# Patient Record
Sex: FEMALE | Employment: OTHER | URBAN - METROPOLITAN AREA
[De-identification: names, ages, dates, MRNs, and addresses within clinical notes are randomized per-mention and may not be internally consistent; named-entity substitution may affect disease eponyms.]

---

## 2021-11-01 DIAGNOSIS — Z76.89 ESTABLISHING CARE WITH NEW DOCTOR, ENCOUNTER FOR: Primary | ICD-10-CM

## 2021-11-23 ENCOUNTER — OFFICE VISIT (OUTPATIENT)
Dept: OBSTETRICS AND GYNECOLOGY | Facility: CLINIC | Age: 66
End: 2021-11-23
Payer: MEDICARE

## 2021-11-23 VITALS
HEART RATE: 79 BPM | HEIGHT: 64 IN | BODY MASS INDEX: 25.35 KG/M2 | WEIGHT: 148.5 LBS | SYSTOLIC BLOOD PRESSURE: 139 MMHG | DIASTOLIC BLOOD PRESSURE: 82 MMHG

## 2021-11-23 DIAGNOSIS — M79.7 FIBROMYOSITIS: ICD-10-CM

## 2021-11-23 DIAGNOSIS — N95.2 ATROPHIC VAGINITIS: ICD-10-CM

## 2021-11-23 DIAGNOSIS — D84.9 IMMUNODEFICIENCY: ICD-10-CM

## 2021-11-23 DIAGNOSIS — N95.1 MENOPAUSAL SYNDROME (HOT FLASHES): ICD-10-CM

## 2021-11-23 DIAGNOSIS — Z01.419 ENCOUNTER FOR ANNUAL ROUTINE GYNECOLOGICAL EXAMINATION: Primary | ICD-10-CM

## 2021-11-23 PROCEDURE — G0101 CA SCREEN;PELVIC/BREAST EXAM: HCPCS | Mod: S$GLB,,, | Performed by: NURSE PRACTITIONER

## 2021-11-23 PROCEDURE — G0101 PR CA SCREEN;PELVIC/BREAST EXAM: ICD-10-PCS | Mod: S$GLB,,, | Performed by: NURSE PRACTITIONER

## 2021-12-06 ENCOUNTER — PATIENT MESSAGE (OUTPATIENT)
Dept: RESEARCH | Facility: HOSPITAL | Age: 66
End: 2021-12-06
Payer: MEDICARE

## 2021-12-09 ENCOUNTER — TELEPHONE (OUTPATIENT)
Dept: PHARMACY | Facility: CLINIC | Age: 66
End: 2021-12-09
Payer: MEDICARE

## 2021-12-09 DIAGNOSIS — Z78.0 MENOPAUSE: Primary | ICD-10-CM

## 2021-12-10 RX ORDER — ESTRADIOL 1 MG/1
1 TABLET ORAL DAILY
Qty: 90 TABLET | Refills: 3 | Status: SHIPPED | OUTPATIENT
Start: 2021-12-10 | End: 2021-12-13

## 2021-12-10 RX ORDER — ESTRADIOL 1 MG/1
3 TABLET ORAL 2 TIMES DAILY
Qty: 30 TABLET | Refills: 11 | Status: SHIPPED | OUTPATIENT
Start: 2021-12-10 | End: 2021-12-10

## 2021-12-13 DIAGNOSIS — N95.1 MENOPAUSAL SYNDROME (HOT FLASHES): Primary | ICD-10-CM

## 2022-01-19 ENCOUNTER — TELEPHONE (OUTPATIENT)
Dept: GASTROENTEROLOGY | Facility: CLINIC | Age: 67
End: 2022-01-19
Payer: MEDICARE

## 2022-01-19 NOTE — TELEPHONE ENCOUNTER
----- Message from Estefania Taylor sent at 1/19/2022  9:18 AM CST -----  Contact: self    ----- Message -----  From: Cory Yañez  Sent: 1/19/2022   8:24 AM CST  To: Jesus Manuel [facility] Staff    Patient called and stated that she had an appt today which is 01- and I could not see appt. Please call patient regarding this matter at [phone]. Thanks

## 2022-04-05 ENCOUNTER — OFFICE VISIT (OUTPATIENT)
Dept: GASTROENTEROLOGY | Facility: CLINIC | Age: 67
End: 2022-04-05
Payer: MEDICARE

## 2022-04-05 VITALS
SYSTOLIC BLOOD PRESSURE: 157 MMHG | OXYGEN SATURATION: 91 % | HEIGHT: 63 IN | WEIGHT: 149 LBS | DIASTOLIC BLOOD PRESSURE: 73 MMHG | BODY MASS INDEX: 26.4 KG/M2 | HEART RATE: 74 BPM

## 2022-04-05 DIAGNOSIS — Z86.010 PERSONAL HISTORY OF COLONIC POLYPS: ICD-10-CM

## 2022-04-05 DIAGNOSIS — R74.8 ELEVATED LIVER ENZYMES: Primary | ICD-10-CM

## 2022-04-05 DIAGNOSIS — F32.A DEPRESSION, UNSPECIFIED: ICD-10-CM

## 2022-04-05 DIAGNOSIS — K73.9 CHRONIC HEPATITIS, UNSPECIFIED: ICD-10-CM

## 2022-04-05 DIAGNOSIS — R76.8 HEPATITIS B CORE ANTIBODY POSITIVE: ICD-10-CM

## 2022-04-05 DIAGNOSIS — K76.0 FATTY LIVER: ICD-10-CM

## 2022-04-05 PROCEDURE — 99204 PR OFFICE/OUTPT VISIT, NEW, LEVL IV, 45-59 MIN: ICD-10-PCS | Mod: S$GLB,,, | Performed by: INTERNAL MEDICINE

## 2022-04-05 PROCEDURE — 99204 OFFICE O/P NEW MOD 45 MIN: CPT | Mod: S$GLB,,, | Performed by: INTERNAL MEDICINE

## 2022-04-05 NOTE — PROGRESS NOTES
Clinic Note    Reason for visit:  The primary encounter diagnosis was Elevated liver enzymes. Diagnoses of Fatty liver, Hepatitis B core antibody positive, Personal history of colonic polyps, Chronic hepatitis, unspecified , and Depression, unspecified  were also pertinent to this visit.    PCP: Justin Muñoz       HPI:  This is a 66 y.o. female who is seeing me due to elevated liver enzymes.  She has been told this for 20+ years.  Her core total antibody has remained positive for hepatitis-B.  She previously had seen Dr. Shahzad Carvalho.  She had a liver biopsy back then was told she had a mild fatty liver.  Mild fibrosis.  She gets immunoglobulin infusions for the past 2 years.  She alternates with diarrhea and constipation.  She had an upper and lower endoscopy about a year ago.  She was told to repeat a colonoscopy in 5 years.  Told she had an ulcer in her esophagus and no follow-up upper endoscopy or medications given from that time.  Previously had colonic polyps but on the last colonoscopy. Previously seen by Dr. Moreno.    Review of Systems   Constitutional: Positive for chills and fatigue. Negative for diaphoresis, fever and unexpected weight change.   HENT: Positive for mouth sores, postnasal drip and sore throat. Negative for nosebleeds, trouble swallowing and voice change.    Eyes: Positive for eye dryness. Negative for pain and discharge.   Respiratory: Positive for cough. Negative for apnea, choking, chest tightness, shortness of breath and wheezing.    Cardiovascular: Negative for chest pain, palpitations, leg swelling and claudication.   Gastrointestinal: Positive for abdominal distention, abdominal pain, constipation, nausea and reflux. Negative for anal bleeding, blood in stool, change in bowel habit, diarrhea, rectal pain, vomiting, fecal incontinence and change in bowel habit.   Genitourinary: Positive for bladder incontinence and hematuria. Negative for difficulty urinating, dysuria, flank pain  and frequency.   Musculoskeletal: Positive for back pain and joint swelling. Negative for arthralgias and joint deformity.   Integumentary:  Negative for color change, rash and wound.   Allergic/Immunologic: Negative for environmental allergies and food allergies.   Neurological: Positive for headaches. Negative for seizures, facial asymmetry, speech difficulty, weakness and memory loss.   Hematological: Negative for adenopathy. Does not bruise/bleed easily.   Psychiatric/Behavioral: Negative for agitation, behavioral problems, confusion, hallucinations and sleep disturbance.      Past Medical History:   Diagnosis Date    Arthritis     Fibromyositis     Immunodeficiency     Mental disorder     depression     Past Surgical History:   Procedure Laterality Date    EYE SURGERY      HYSTERECTOMY       Family History   Family history unknown: Yes     Social History     Tobacco Use    Smoking status: Never Smoker    Smokeless tobacco: Never Used   Substance Use Topics    Alcohol use: Never    Drug use: Never     Review of patient's allergies indicates:  No Known Allergies   Medication List with Changes/Refills   Current Medications    AZELASTINE (ASTELIN) 137 MCG NASAL SPRAY    1 spray by Nasal route once daily.    AZITHROMYCIN (Z-LISA) 250 MG TABLET    Take 500 mg by mouth 2 (two) times daily.    BUPROPION (WELLBUTRIN) 75 MG TABLET    Take 75 mg by mouth once daily.    CONJUGATED ESTROGENS (PREMARIN) VAGINAL CREAM    Place 1 g vaginally twice a week.    GABAPENTIN (NEURONTIN) 600 MG TABLET    Take 600 mg by mouth once daily.    HYDROCODONE-IBUPROFEN (VICOPROFEN) 5-200 MG PER TABLET    Take 1 tablet by mouth every 8 (eight) hours as needed.    PAROXETINE (PAXIL-CR) 25 MG 24 HR TABLET    Take 25 mg by mouth every morning.    PRAVASTATIN (PRAVACHOL) 10 MG TABLET    Take 10 mg by mouth once daily.   Discontinued Medications    ESTROGENS, CONJUGATED, (PREMARIN) 0.625 MG TABLET    Take 1 tablet (0.625 mg total) by  "mouth once daily.         Vital Signs:  BP (!) 157/73   Pulse 74   Ht 5' 3" (1.6 m)   Wt 67.6 kg (149 lb)   SpO2 (!) 91%   BMI 26.39 kg/m²   Body mass index is 26.39 kg/m².      Physical Exam  Vitals reviewed.   Constitutional:       General: She is awake. She is not in acute distress.     Appearance: Normal appearance. She is well-developed. She is not ill-appearing, toxic-appearing or diaphoretic.   HENT:      Head: Normocephalic and atraumatic.      Nose: Nose normal.      Mouth/Throat:      Mouth: Mucous membranes are moist.      Pharynx: Oropharynx is clear. No oropharyngeal exudate or posterior oropharyngeal erythema.   Eyes:      General: Lids are normal. Gaze aligned appropriately. No scleral icterus.        Right eye: No discharge.         Left eye: No discharge.      Extraocular Movements: Extraocular movements intact.      Conjunctiva/sclera: Conjunctivae normal.   Neck:      Trachea: Trachea normal.   Cardiovascular:      Rate and Rhythm: Normal rate and regular rhythm.      Pulses:           Radial pulses are 2+ on the right side and 2+ on the left side.   Pulmonary:      Effort: Pulmonary effort is normal. No respiratory distress.      Breath sounds: Normal breath sounds. No stridor. No wheezing or rhonchi.   Chest:      Chest wall: No tenderness.   Abdominal:      General: Abdomen is flat. Bowel sounds are normal. There is no distension.      Palpations: Abdomen is soft. There is no fluid wave, hepatomegaly or mass.      Tenderness: There is no abdominal tenderness. There is no guarding or rebound.   Musculoskeletal:         General: No tenderness or deformity.      Cervical back: Full passive range of motion without pain and neck supple. No tenderness.      Right lower leg: No edema.      Left lower leg: No edema.   Lymphadenopathy:      Cervical: No cervical adenopathy.   Skin:     General: Skin is warm and dry.      Capillary Refill: Capillary refill takes less than 2 seconds.      Coloration: " Skin is not cyanotic, jaundiced or pale.      Findings: No rash.   Neurological:      General: No focal deficit present.      Mental Status: She is alert and oriented to person, place, and time.      Cranial Nerves: No facial asymmetry.      Motor: No tremor.   Psychiatric:         Attention and Perception: Attention normal.         Mood and Affect: Mood and affect normal.         Speech: Speech normal.         Behavior: Behavior normal. Behavior is cooperative.            All of the data above and below has been reviewed by myself and any further interpretations will be reflected in the assessment and plan.   The data includes review of external notes, and independent interpretation of lab results, procedures, x-rays, and imaging reports.      Assessment:  Elevated liver enzymes  -     AFP Tumor Marker; Future; Expected date: 04/05/2022  -     Alkaline Phosphatase, Isoenzymes; Future; Expected date: 04/05/2022  -     Alpha-1-Antitrypsin; Future; Expected date: 04/05/2022  -     TAVO Screen w/Reflex; Future; Expected date: 04/05/2022  -     Anti-Liver, Kidney, Microsome Ab; Future; Expected date: 04/05/2022  -     Antimitochondrial Antibody; Future; Expected date: 04/05/2022  -     Anti-Neutrophilic Cytoplasmic Antibody; Future; Expected date: 04/05/2022  -     Anti-Smooth Muscle Antibody; Future; Expected date: 04/05/2022  -     CBC Auto Differential; Future; Expected date: 04/05/2022  -     Ceruloplasmin; Future; Expected date: 04/05/2022  -     Comprehensive Metabolic Panel; Future; Expected date: 04/05/2022  -     Ferritin; Future; Expected date: 04/05/2022  -     Gamma GT; Future; Expected date: 04/05/2022  -     Hepatitis B Core Antibody, Total; Future; Expected date: 04/05/2022  -     Hepatitis B Surface Ab, Qualitative; Future; Expected date: 04/05/2022  -     Hepatitis B Surface Antigen; Future; Expected date: 04/05/2022  -     Hepatitis C Antibody; Future; Expected date: 04/05/2022  -     Hepatitis A  antibody, IgG; Future; Expected date: 04/05/2022  -     HIV 1/2 Ag/Ab (4th Gen); Future; Expected date: 04/05/2022  -     Iron and TIBC; Future; Expected date: 04/05/2022  -     Misc Sendout Test, Blood GRAVES FibroSURE (not HCV or FABEINNE); Future; Expected date: 04/05/2022  -     Protime-INR; Future; Expected date: 04/05/2022  -     TSH; Future; Expected date: 04/05/2022  -     Hepatitis B e antibody; Future; Expected date: 04/05/2022  -     HEPATITIS B E ANTIGEN; Future; Expected date: 04/05/2022  -     Hepatitis B Viral DNA, Quantitative; Future; Expected date: 04/05/2022  -     US Abdomen Complete; Future; Expected date: 04/05/2022    Fatty liver  -     AFP Tumor Marker; Future; Expected date: 04/05/2022  -     Alkaline Phosphatase, Isoenzymes; Future; Expected date: 04/05/2022  -     Alpha-1-Antitrypsin; Future; Expected date: 04/05/2022  -     TAVO Screen w/Reflex; Future; Expected date: 04/05/2022  -     Anti-Liver, Kidney, Microsome Ab; Future; Expected date: 04/05/2022  -     Antimitochondrial Antibody; Future; Expected date: 04/05/2022  -     Anti-Neutrophilic Cytoplasmic Antibody; Future; Expected date: 04/05/2022  -     Anti-Smooth Muscle Antibody; Future; Expected date: 04/05/2022  -     CBC Auto Differential; Future; Expected date: 04/05/2022  -     Ceruloplasmin; Future; Expected date: 04/05/2022  -     Comprehensive Metabolic Panel; Future; Expected date: 04/05/2022  -     Ferritin; Future; Expected date: 04/05/2022  -     Gamma GT; Future; Expected date: 04/05/2022  -     Hepatitis B Core Antibody, Total; Future; Expected date: 04/05/2022  -     Hepatitis B Surface Ab, Qualitative; Future; Expected date: 04/05/2022  -     Hepatitis B Surface Antigen; Future; Expected date: 04/05/2022  -     Hepatitis C Antibody; Future; Expected date: 04/05/2022  -     Hepatitis A antibody, IgG; Future; Expected date: 04/05/2022  -     HIV 1/2 Ag/Ab (4th Gen); Future; Expected date: 04/05/2022  -     Iron and TIBC;  Future; Expected date: 04/05/2022  -     Misc Sendout Test, Blood GRAVES FibroSURE (not HCV or FABIENNE); Future; Expected date: 04/05/2022  -     Protime-INR; Future; Expected date: 04/05/2022  -     TSH; Future; Expected date: 04/05/2022  -     Hepatitis B e antibody; Future; Expected date: 04/05/2022  -     HEPATITIS B E ANTIGEN; Future; Expected date: 04/05/2022  -     Hepatitis B Viral DNA, Quantitative; Future; Expected date: 04/05/2022  -     US Abdomen Complete; Future; Expected date: 04/05/2022    Hepatitis B core antibody positive  -     AFP Tumor Marker; Future; Expected date: 04/05/2022  -     Alkaline Phosphatase, Isoenzymes; Future; Expected date: 04/05/2022  -     Alpha-1-Antitrypsin; Future; Expected date: 04/05/2022  -     TAVO Screen w/Reflex; Future; Expected date: 04/05/2022  -     Anti-Liver, Kidney, Microsome Ab; Future; Expected date: 04/05/2022  -     Antimitochondrial Antibody; Future; Expected date: 04/05/2022  -     Anti-Neutrophilic Cytoplasmic Antibody; Future; Expected date: 04/05/2022  -     Anti-Smooth Muscle Antibody; Future; Expected date: 04/05/2022  -     CBC Auto Differential; Future; Expected date: 04/05/2022  -     Ceruloplasmin; Future; Expected date: 04/05/2022  -     Comprehensive Metabolic Panel; Future; Expected date: 04/05/2022  -     Ferritin; Future; Expected date: 04/05/2022  -     Gamma GT; Future; Expected date: 04/05/2022  -     Hepatitis B Core Antibody, Total; Future; Expected date: 04/05/2022  -     Hepatitis B Surface Ab, Qualitative; Future; Expected date: 04/05/2022  -     Hepatitis B Surface Antigen; Future; Expected date: 04/05/2022  -     Hepatitis C Antibody; Future; Expected date: 04/05/2022  -     Hepatitis A antibody, IgG; Future; Expected date: 04/05/2022  -     HIV 1/2 Ag/Ab (4th Gen); Future; Expected date: 04/05/2022  -     Iron and TIBC; Future; Expected date: 04/05/2022  -     Misc Sendout Test, Blood GRAVES FibroSURE (not HCV or FABIENNE); Future; Expected  date: 04/05/2022  -     Protime-INR; Future; Expected date: 04/05/2022  -     TSH; Future; Expected date: 04/05/2022  -     Hepatitis B e antibody; Future; Expected date: 04/05/2022  -     HEPATITIS B E ANTIGEN; Future; Expected date: 04/05/2022  -     Hepatitis B Viral DNA, Quantitative; Future; Expected date: 04/05/2022  -     US Abdomen Complete; Future; Expected date: 04/05/2022    Personal history of colonic polyps    Chronic hepatitis, unspecified   -     AFP Tumor Marker; Future; Expected date: 04/05/2022    Depression, unspecified   -     TSH; Future; Expected date: 04/05/2022    Will obtain abdominal ultrasound and liver workup.  May be able to avoid a repeat liver biopsy.     Recommendations:  Obtain blood results and schedule ultrasound.    Risks, benefits, and alternatives of medical management, any associated procedures, and/or treatment discussed with the patient. Patient given opportunity to ask questions and voices understanding. Patient has elected to proceed with the recommended care modalities as discussed.    Follow up in about 6 months (around 10/5/2022).    Order summary:  Orders Placed This Encounter   Procedures    US Abdomen Complete    AFP Tumor Marker    Alkaline Phosphatase, Isoenzymes    Alpha-1-Antitrypsin    TAVO Screen w/Reflex    Anti-Liver, Kidney, Microsome Ab    Antimitochondrial Antibody    Anti-Neutrophilic Cytoplasmic Antibody    Anti-Smooth Muscle Antibody    CBC Auto Differential    Ceruloplasmin    Comprehensive Metabolic Panel    Ferritin    Gamma GT    Hepatitis B Core Antibody, Total    Hepatitis B Surface Ab, Qualitative    Hepatitis B Surface Antigen    Hepatitis C Antibody    Hepatitis A antibody, IgG    HIV 1/2 Ag/Ab (4th Gen)    Iron and TIBC    Misc Sendout Test, Blood GRAVES FibroSURE (not HCV or FABIENNE)    Protime-INR    TSH    Hepatitis B e antibody    HEPATITIS B E ANTIGEN    Hepatitis B Viral DNA, Quantitative          Leanna Ken  MD    This document was created using a voice recognition transcribing system. Incorrect words or phrases may have been missed during proofreading. Please interpret accordingly or contact me for clarification.

## 2022-04-05 NOTE — LETTER
April 5, 2022        Justin Muñoz MD  [address]  [address]  [facility] [place] [address]             [facility] [facility]  [address] [address]  Phone: [phone]  Fax: [phone]   Patient: Blanca Ramirez   MR Number: [id number]   YOB: 1955   Date of Visit: 4/5/2022       Dear Dr. Muñoz:    Thank you for referring Blanca Ramirez to me for evaluation. Attached you will find relevant portions of my assessment and plan of care.    If you have questions, please do not hesitate to call me. I look forward to following Blanca Ramirez along with you.    Sincerely,      Leanna Ken MD            CC  No Recipients    Enclosure

## 2022-04-05 NOTE — LETTER
April 5, 2022        Justin Muñoz MD  [address]  [address]  [facility] [place] [address]             [facility] [facility]  [address] [address]  Phone: [phone]  Fax: [phone]   Patient: Blanca Ramirez   MR Number: [id number]   YOB: 1955   Date of Visit: 4/5/2022     Dear Dr. Muñoz,     {[id number]}    Sincerely,      Leanna Ken MD            CC  No Recipients    Enclosure

## 2022-04-12 NOTE — PROGRESS NOTES
Abd US: CBD 13mm, no GB.   Await all blood results.   erre Archer x2 weeks to ensure I get them to review.  KIRILL

## 2022-04-25 NOTE — PROGRESS NOTES
GRAVES FibroSure F0 S3 N2, 22/19, GGT 47, chol 271, .  AFP/ferr/Fe/T/TSH/CBC/LKM/APiso/A1AT/ceru nl. GGT 51H. Cr 0.95H, 17/17/155H/0.23, CMP onl, INR 0.85. sAg/eAg/HCV/HIV neg. sAb/cAb pos. HBV VL ND. HAV Ab pos, TAVO/ASMA/ANCA neg.  Please send me results altogether once all are finalized. Keep printed copies to confirm we get them all.   NBP

## 2022-04-27 ENCOUNTER — TELEPHONE (OUTPATIENT)
Dept: PRIMARY CARE CLINIC | Facility: CLINIC | Age: 67
End: 2022-04-27
Payer: MEDICARE

## 2022-04-27 NOTE — PROGRESS NOTES
AMA neg, GRAVES FibroSure F0 S3 N2. 22/19.  CMA to notify patient. Her US looked well. Her liver work up looked well.  She has signs of fatty liver but not cirrhosis. Her cholesterol and triglycerides are elevated. Her blood work shows she was exposed to hepatitis B in the past but her body cleared it on its own and she no longer has it. Her blood also shows she is either immune to or previously exposed to hepatitis A (not signs it is active). Her liver enzymes now are normal. I do not see a need to repeat her liver Bx. I recommend getting her [facility] records of last E/C to see if/when to repeat them. Have her signs a release and snooze to confirm we get them.  NBP

## 2022-04-28 ENCOUNTER — TELEPHONE (OUTPATIENT)
Dept: GASTROENTEROLOGY | Facility: CLINIC | Age: 67
End: 2022-04-28
Payer: MEDICARE

## 2022-04-28 ENCOUNTER — PATIENT MESSAGE (OUTPATIENT)
Dept: GASTROENTEROLOGY | Facility: CLINIC | Age: 67
End: 2022-04-28
Payer: MEDICARE

## 2022-04-28 NOTE — TELEPHONE ENCOUNTER
----- Message from Rekha Hernandez sent at 4/28/2022  8:36 AM CDT -----  Contact: Patient  2ND request    Patient calling regarding her test results      Call back # [phone]

## 2022-05-03 ENCOUNTER — TELEPHONE (OUTPATIENT)
Dept: GASTROENTEROLOGY | Facility: CLINIC | Age: 67
End: 2022-05-03
Payer: MEDICARE

## 2022-05-03 NOTE — TELEPHONE ENCOUNTER
----- Message from Sally Arambula sent at 5/3/2022  9:15 AM CDT -----  Blanca Ramirez would like for the nurse to give her a call back with an update on her on her blood work results, she said she hasn't heard back from anyone and that it has been two weeks. [phone] (home)

## 2022-05-12 ENCOUNTER — PATIENT MESSAGE (OUTPATIENT)
Dept: GASTROENTEROLOGY | Facility: CLINIC | Age: 67
End: 2022-05-12
Payer: MEDICARE

## 2022-05-20 ENCOUNTER — TELEPHONE (OUTPATIENT)
Dept: GASTROENTEROLOGY | Facility: CLINIC | Age: 67
End: 2022-05-20
Payer: MEDICARE

## 2022-05-20 NOTE — TELEPHONE ENCOUNTER
Total core positive and surface antigen negative means hepatitis B is not active. She is correct on that. It should not re-activate unless her immune system is significantly decreased (like chemotherapy, and we place patients on hepatitis B therapy if that is the case).  The surface antibody does mean she should be protected from getting is again but she was already exposed to it so that point is moot. She is correct that the GRAVES results support fatty liver as the source of liver inflammation without signs of cirrhosis or scar tissue and that is what is likely leading to her GGT/AP elevations. She should follow up with her PHCP to see if therapy for her cholesterol is needed. Although her Cr/Hct/INR were out of normal range I would consider them her normal.   Fax a copy of these results to her PHCP so that she may follow up with him on those.  NBP

## 2022-05-20 NOTE — TELEPHONE ENCOUNTER
I received this message via E-mail from the patient:    Thank you for sending these. I have some questions I hope you dont mind answering. This will probably be a long text. Lol. Ok so hep b core being reactive means Ive had hep b and hep b surface being negative means its not active. Right?  Can it come back?  Then on one of the hepb surface ab tests shows positive. Does this mean I have the antibodies and am protected? The Graves fib gail results score for fibrous showed no fibrous - means no scarring, right?  The straits is score shows s3 - marked or severe straitosis. Does this mean severe fatty liver. And the GRAVES scoring is N2 - GRAVES. So do I have GRAVES?    Cholesterol is high should I be on meds?  The GGTP and Alk Fantasmas were high.  Usually several of the kidney markers are high and Ive had 3 or 4 GFR results less than 60. Could something be wrong with kidneys?  My hematocrit is usually high what does this mean?  What is pro time and what does it mean when low?   Thank you for responding

## 2022-07-22 ENCOUNTER — PATIENT MESSAGE (OUTPATIENT)
Dept: GASTROENTEROLOGY | Facility: CLINIC | Age: 67
End: 2022-07-22
Payer: MEDICARE

## 2022-07-25 ENCOUNTER — PATIENT MESSAGE (OUTPATIENT)
Dept: GASTROENTEROLOGY | Facility: CLINIC | Age: 67
End: 2022-07-25
Payer: MEDICARE

## 2022-08-13 NOTE — TELEPHONE ENCOUNTER
I would like these records before her upcoming OV with me. Did she come by to sign the release forms?  KIRILL

## 2022-08-26 ENCOUNTER — PATIENT MESSAGE (OUTPATIENT)
Dept: GASTROENTEROLOGY | Facility: CLINIC | Age: 67
End: 2022-08-26
Payer: MEDICARE

## 2022-10-05 ENCOUNTER — OFFICE VISIT (OUTPATIENT)
Dept: GASTROENTEROLOGY | Facility: CLINIC | Age: 67
End: 2022-10-05
Payer: MEDICARE

## 2022-10-05 VITALS
WEIGHT: 145 LBS | DIASTOLIC BLOOD PRESSURE: 80 MMHG | SYSTOLIC BLOOD PRESSURE: 150 MMHG | BODY MASS INDEX: 25.69 KG/M2 | HEART RATE: 72 BPM | HEIGHT: 63 IN | OXYGEN SATURATION: 98 %

## 2022-10-05 DIAGNOSIS — Z86.010 HISTORY OF COLON POLYPS: ICD-10-CM

## 2022-10-05 DIAGNOSIS — Z87.19 HISTORY OF DUODENAL ULCER: ICD-10-CM

## 2022-10-05 DIAGNOSIS — K59.04 CHRONIC IDIOPATHIC CONSTIPATION: ICD-10-CM

## 2022-10-05 DIAGNOSIS — K21.9 GASTROESOPHAGEAL REFLUX DISEASE, UNSPECIFIED WHETHER ESOPHAGITIS PRESENT: Primary | ICD-10-CM

## 2022-10-05 DIAGNOSIS — R74.8 ELEVATED LIVER ENZYMES: ICD-10-CM

## 2022-10-05 PROCEDURE — 99214 OFFICE O/P EST MOD 30 MIN: CPT | Mod: S$GLB,,, | Performed by: INTERNAL MEDICINE

## 2022-10-05 PROCEDURE — 99214 PR OFFICE/OUTPT VISIT, EST, LEVL IV, 30-39 MIN: ICD-10-PCS | Mod: S$GLB,,, | Performed by: INTERNAL MEDICINE

## 2022-10-05 RX ORDER — IPRATROPIUM BROMIDE 42 UG/1
SPRAY, METERED NASAL
COMMUNITY
Start: 2022-09-22 | End: 2023-04-04

## 2022-10-05 RX ORDER — CYCLOBENZAPRINE HCL 10 MG
TABLET ORAL
COMMUNITY
Start: 2022-06-24

## 2022-10-05 RX ORDER — BUPROPION HYDROCHLORIDE 150 MG/1
150 TABLET, EXTENDED RELEASE ORAL 2 TIMES DAILY
COMMUNITY
Start: 2022-04-24 | End: 2023-04-04

## 2022-10-05 RX ORDER — ESTRADIOL 0.1 MG/G
1 CREAM VAGINAL
COMMUNITY
End: 2023-04-04

## 2022-10-05 RX ORDER — ACETAMINOPHEN 325 MG/1
650 TABLET ORAL EVERY 6 HOURS PRN
COMMUNITY

## 2022-10-05 RX ORDER — PAROXETINE 10 MG/1
TABLET, FILM COATED ORAL
COMMUNITY
Start: 2022-08-09

## 2022-10-05 RX ORDER — HYDROCODONE BITARTRATE AND ACETAMINOPHEN 5; 325 MG/1; MG/1
1 TABLET ORAL 3 TIMES DAILY
COMMUNITY
Start: 2022-09-27 | End: 2023-04-04

## 2022-10-05 RX ORDER — HUMAN IMMUNOGLOBULIN G 0.2 G/ML
LIQUID SUBCUTANEOUS
COMMUNITY

## 2022-10-05 RX ORDER — FLUOCINOLONE ACETONIDE 0.11 MG/ML
OIL AURICULAR (OTIC)
COMMUNITY
Start: 2022-04-11 | End: 2023-04-04

## 2022-10-05 RX ORDER — FLUTICASONE PROPIONATE 50 MCG
2 SPRAY, SUSPENSION (ML) NASAL
COMMUNITY
End: 2023-04-04

## 2022-10-05 RX ORDER — PAROXETINE HYDROCHLORIDE 40 MG/1
TABLET, FILM COATED ORAL
COMMUNITY

## 2022-10-05 RX ORDER — IBUPROFEN 200 MG
200 TABLET ORAL EVERY 6 HOURS PRN
COMMUNITY

## 2022-10-05 RX ORDER — PANTOPRAZOLE SODIUM 40 MG/1
40 TABLET, DELAYED RELEASE ORAL DAILY
Qty: 90 TABLET | Refills: 3 | Status: SHIPPED | OUTPATIENT
Start: 2022-10-05 | End: 2023-04-04

## 2022-10-05 RX ORDER — CETIRIZINE HYDROCHLORIDE 5 MG/1
5 TABLET ORAL DAILY PRN
COMMUNITY

## 2022-10-05 RX ORDER — TIZANIDINE 4 MG/1
4 TABLET ORAL 3 TIMES DAILY PRN
COMMUNITY
Start: 2022-07-28 | End: 2023-04-04

## 2022-10-05 NOTE — PATIENT INSTRUCTIONS
Bring us a copy of previous records.   Schedule upper endoscopy.   Begin taking pantoprazole 40 mg daily.   Begin taking Trulance 3 mg daily for constipation. Notify our office if this works for you.   Bring records to my office (or upload via the online portal) regarding your prior endoscopies results, pathology and ERCP evaluation.    Please notify my office if you have not been contacted within two weeks after any procedures, submitting any samples (biopsies, blood, stool, urine, etc.) or after any imaging (X-ray, CT, MRI, etc.).

## 2022-10-05 NOTE — PROGRESS NOTES
Clinic Note    Reason for visit:  The primary encounter diagnosis was Gastroesophageal reflux disease, unspecified whether esophagitis present. Diagnoses of Chronic idiopathic constipation, Elevated liver enzymes, History of colon polyps, and History of duodenal ulcer were also pertinent to this visit.    PCP: Justin Muñoz       HPI:  This is a 66 y.o. female who is here for a follow up. Patient reports constipation that may follow with diarrhea. May go 2 weeks without a BM. Once gets severe, she takes Miralax and after about 4 days may have BM. Has not tried any other medication for constipation. She has h/o elevated LFTs and  liver work up and ultrasound showed signs of fatty liver. She takes 2 Tylenol with every Hizentra infusion every week and may take more. She also reports taking either Aleve or Advil daily. She has been taking Tylenol for about a year. Reports rare alcohol use. She had an upper and lower endoscopy about a year ago.  She was told to repeat a colonoscopy in 5 years.  Told she had an ulcer in her esophagus and no follow-up upper endoscopy or medications given from that time. Has h/o colonic polyps and duodenal ulcer. She reports GERD, has tried Nexium OTC in the past. Reports intermittent dysphagia with solids. Feels food slowly goes down.     She had a crypt abscess on a colonoscopy previously.    EGD 4/12/2022: Abd US: CBD 13mm, no GB.     4/2022: GRAVES FibroSure F0 S3 N2, 22/19, GGT 47, chol 271, .  AFP/ferr/Fe/T/TSH/CBC/LKM/APiso/A1AT/ceru nl. GGT 51H. Cr 0.95H, 17/17/155H/0.23, CMP onl, INR 0.85. sAg/eAg/HCV/HIV neg. sAb/cAb pos. HBV VL ND. HAV Ab pos, TAVO/ASMA/ANCA neg.    Review of Systems   Constitutional:  Positive for diaphoresis and fatigue. Negative for chills, fever and unexpected weight change.   HENT:  Positive for postnasal drip and sore throat. Negative for mouth sores, nosebleeds, trouble swallowing and voice change.    Eyes:  Positive for pain and eye dryness. Negative  for discharge.   Respiratory:  Positive for cough and shortness of breath. Negative for apnea, choking, chest tightness and wheezing.    Cardiovascular:  Negative for chest pain, palpitations, leg swelling and claudication.   Gastrointestinal:  Positive for abdominal distention, abdominal pain, change in bowel habit, constipation, reflux and change in bowel habit. Negative for anal bleeding, blood in stool, diarrhea, nausea, rectal pain, vomiting and fecal incontinence.   Genitourinary:  Positive for bladder incontinence and hematuria. Negative for difficulty urinating, dysuria, flank pain and frequency.   Musculoskeletal:  Positive for back pain, joint swelling and joint deformity. Negative for arthralgias.   Integumentary:  Negative for color change, rash and wound.   Allergic/Immunologic: Negative for environmental allergies and food allergies.   Neurological:  Positive for headaches. Negative for seizures, facial asymmetry, speech difficulty, weakness and memory loss.   Hematological:  Negative for adenopathy. Does not bruise/bleed easily.   Psychiatric/Behavioral:  Positive for agitation and sleep disturbance. Negative for behavioral problems, confusion and hallucinations.       Past Medical History:   Diagnosis Date    Arthritis     Colon polyp     Fibromyositis     Immunodeficiency     Mental disorder     depression    Neuropathy      Past Surgical History:   Procedure Laterality Date    AUGMENTATION OF BREAST Bilateral     CATARACT EXTRACTION Bilateral     CHOLECYSTECTOMY      COLONOSCOPY      EYE SURGERY      FIRST RIB REMOVAL      heart ablation      HYSTERECTOMY      LASER PHOTOCOAGULATION OF RETINA Left     prescectoral neurectomy      tonsilectomy       Family History   Problem Relation Age of Onset    Liver disease Mother     Stroke Brother     Cirrhosis Brother     Breast cancer Paternal Aunt     Bone cancer Paternal Aunt      Social History     Tobacco Use    Smoking status: Never    Smokeless  tobacco: Never   Substance Use Topics    Alcohol use: Never    Drug use: Never     Review of patient's allergies indicates:  No Known Allergies     Medication List with Changes/Refills   New Medications    PANTOPRAZOLE (PROTONIX) 40 MG TABLET    Take 1 tablet (40 mg total) by mouth once daily.   Current Medications    ACETAMINOPHEN (TYLENOL) 325 MG TABLET    Take 650 mg by mouth every 6 (six) hours as needed.    BUPROPION (WELLBUTRIN SR) 150 MG TBSR 12 HR TABLET    Take 150 mg by mouth 2 (two) times daily.    CETIRIZINE (ZYRTEC) 5 MG TABLET    Take 5 mg by mouth daily as needed.    CONJUGATED ESTROGENS (PREMARIN) VAGINAL CREAM    Place 1 g vaginally twice a week.    CYCLOBENZAPRINE (FLEXERIL) 10 MG TABLET        ESTRADIOL (ESTRACE) 0.01 % (0.1 MG/GRAM) VAGINAL CREAM    1 g.    FLUOCINOLONE ACETONIDE OIL 0.01 % DROP    APPLY 5 DROPS TO AFFECTED EAR ONCE DAILY FOR 30 DAYS    FLUTICASONE PROPIONATE (FLONASE) 50 MCG/ACTUATION NASAL SPRAY    2 sprays by Nasal route.    GABAPENTIN (NEURONTIN) 600 MG TABLET    Take 600 mg by mouth once daily.    HYDROCODONE-ACETAMINOPHEN (NORCO) 5-325 MG PER TABLET    Take 1 tablet by mouth 3 (three) times daily.    IBUPROFEN (ADVIL,MOTRIN) 200 MG TABLET    Take 200 mg by mouth every 6 (six) hours as needed.    IMMUN GLOB G,IGG,-PRO-IGA 0-50 (HIZENTRA) 10 GRAM/50 ML (20 %) SOLN    Hizentra 10 gram/50 mL (20 %) subcutaneous solution   Infuse TEN grams SUBCUTANEOUSLY ONCE WEEKLY    IPRATROPIUM (ATROVENT) 42 MCG (0.06 %) NASAL SPRAY    USE 2 SPRAYS IN EACH NOSTRIL TWICE DAILY AS NEEDED    PAROXETINE (PAXIL) 10 MG TABLET        PAROXETINE (PAXIL) 40 MG TABLET    paroxetine 40 mg tablet   Take 1 tablet every day by oral route.    PAROXETINE (PAXIL-CR) 25 MG 24 HR TABLET    Take 25 mg by mouth every morning.    TIZANIDINE (ZANAFLEX) 4 MG TABLET    Take 4 mg by mouth 3 (three) times daily as needed.   Discontinued Medications    AZELASTINE (ASTELIN) 137 MCG NASAL SPRAY    1 spray by Nasal route  "once daily.    AZITHROMYCIN (Z-LISA) 250 MG TABLET    Take 500 mg by mouth 2 (two) times daily.    BUPROPION (WELLBUTRIN) 75 MG TABLET    Take 75 mg by mouth once daily.    HYDROCODONE-IBUPROFEN (VICOPROFEN) 5-200 MG PER TABLET    Take 1 tablet by mouth every 8 (eight) hours as needed.    PRAVASTATIN (PRAVACHOL) 10 MG TABLET    Take 10 mg by mouth once daily.         Vital Signs:  BP (!) 150/80   Pulse 72   Ht 5' 3" (1.6 m)   Wt 65.8 kg (145 lb)   SpO2 98%   BMI 25.69 kg/m²         Physical Exam  Vitals reviewed.   Constitutional:       General: She is awake. She is not in acute distress.     Appearance: Normal appearance. She is well-developed. She is not ill-appearing, toxic-appearing or diaphoretic.   HENT:      Head: Normocephalic and atraumatic.      Nose: Nose normal.      Mouth/Throat:      Mouth: Mucous membranes are moist.      Pharynx: Oropharynx is clear. No oropharyngeal exudate or posterior oropharyngeal erythema.   Eyes:      General: Lids are normal. Gaze aligned appropriately. No scleral icterus.        Right eye: No discharge.         Left eye: No discharge.      Extraocular Movements: Extraocular movements intact.      Conjunctiva/sclera: Conjunctivae normal.   Neck:      Trachea: Trachea normal.   Cardiovascular:      Rate and Rhythm: Normal rate and regular rhythm.      Pulses:           Radial pulses are 2+ on the right side and 2+ on the left side.   Pulmonary:      Effort: Pulmonary effort is normal. No respiratory distress.      Breath sounds: Normal breath sounds. No stridor. No wheezing or rhonchi.   Chest:      Chest wall: No tenderness.   Abdominal:      General: Bowel sounds are normal. There is no distension.      Palpations: Abdomen is soft. There is no fluid wave, hepatomegaly or mass.      Tenderness: There is no abdominal tenderness. There is no guarding or rebound.   Musculoskeletal:         General: No tenderness or deformity.      Cervical back: Full passive range of motion " without pain and neck supple. No tenderness.      Right lower leg: No edema.      Left lower leg: No edema.   Lymphadenopathy:      Cervical: No cervical adenopathy.   Skin:     General: Skin is warm and dry.      Capillary Refill: Capillary refill takes less than 2 seconds.      Coloration: Skin is not cyanotic, jaundiced or pale.      Findings: No rash.   Neurological:      General: No focal deficit present.      Mental Status: She is alert and oriented to person, place, and time.      Cranial Nerves: No facial asymmetry.      Motor: No tremor.   Psychiatric:         Attention and Perception: Attention normal.         Mood and Affect: Mood and affect normal.         Speech: Speech normal.         Behavior: Behavior normal. Behavior is cooperative.          All of the data above and below has been reviewed by myself and any further interpretations will be reflected in the assessment and plan.   The data includes review of external notes, and independent interpretation of lab results, procedures, x-rays, and imaging reports.      Assessment:  Gastroesophageal reflux disease, unspecified whether esophagitis present  -     Ambulatory Referral to External Surgery  -     pantoprazole (PROTONIX) 40 MG tablet; Take 1 tablet (40 mg total) by mouth once daily.  Dispense: 90 tablet; Refill: 3    Chronic idiopathic constipation    Elevated liver enzymes    History of colon polyps    History of duodenal ulcer  -     Ambulatory Referral to External Surgery    Liver work up supports GRAVES. Patient also with daily Tylenol use.   Has h/o duodenal ulcer and esophageal ulcer. Will plan for EGD with E/G/D biopsies.    Trial of Trulance for constipation. Samples given.     Recommendations:  Schedule upper endoscopy.   Begin taking pantoprazole 40 mg daily.   Begin taking Trulance 3 mg daily for constipation. Notify our office if this works for you.   Bring records to my office (or upload via the online portal) regarding your prior  endoscopies results, pathology and ERCP evaluation.    Please notify my office if you have not been contacted within two weeks after any procedures, submitting any samples (biopsies, blood, stool, urine, etc.) or after any imaging (X-ray, CT, MRI, etc.).     Risks, benefits, and alternatives of medical management, any associated procedures, and/or treatment discussed with the patient. Patient given opportunity to ask questions and voices understanding. Patient has elected to proceed with the recommended care modalities as discussed.    Follow up in about 6 months (around 4/5/2023).    Order summary:  Orders Placed This Encounter   Procedures    Ambulatory Referral to External Surgery          Instructed patient to notify my office if they have not been contacted within two weeks after any procedures, submitting any samples (biopsies, blood, stool, urine, etc.) or after any imaging (X-ray, CT, MRI, etc.).     Leanna Ken MD    This document may have been created using a voice recognition transcribing system. Incorrect words or phrases may have been missed during proofreading. Please interpret accordingly or contact me for clarification.

## 2022-10-05 NOTE — LETTER
October 5, 2022        Justin Muñoz MD  [address]  [address]  [facility] [place] [address]             [facility]  [address] [place] [address]  Phone: [phone]  Fax: [phone]   Patient: Blanca Ramirez   MR Number: [id number]   YOB: 1955   Date of Visit: 10/5/2022       Dear Dr. Muñoz:    Thank you for referring Blanca Ramirez to me for evaluation. Attached you will find relevant portions of my assessment and plan of care.    If you have questions, please do not hesitate to call me. I look forward to following Blanca Ramirez along with you.    Sincerely,      Leanna Ken MD            CC  No Recipients    Enclosure

## 2022-11-03 ENCOUNTER — TELEPHONE (OUTPATIENT)
Dept: OBSTETRICS AND GYNECOLOGY | Facility: CLINIC | Age: 67
End: 2022-11-03
Payer: MEDICARE

## 2022-11-03 NOTE — TELEPHONE ENCOUNTER
----- Message from Sofy Owen sent at 11/3/2022 10:56 AM CDT -----  Contact: SELF  Greetings -     A medication was sent to patient's pharmacy on 10/05/22 ordered by YISSEL Ann within [facility] Whittington's office, and patient wants to know since Fidelia Marshall is no longer w/[facility] if she is going to be seen by FUNMILAYO Whittington for her annual visits, can you review and reach out to patient @ [phone]?      Pt is going to wait and schedule her annual.

## 2022-11-09 ENCOUNTER — TELEPHONE (OUTPATIENT)
Dept: GASTROENTEROLOGY | Facility: CLINIC | Age: 67
End: 2022-11-09

## 2022-11-09 DIAGNOSIS — K21.9 GASTROESOPHAGEAL REFLUX DISEASE, UNSPECIFIED WHETHER ESOPHAGITIS PRESENT: Primary | ICD-10-CM

## 2022-11-09 DIAGNOSIS — Z87.19 HISTORY OF DUODENAL ULCER: ICD-10-CM

## 2022-11-09 NOTE — TELEPHONE ENCOUNTER
2000 [facility] E/C 5mm , gastritis, s/p Bx and 54Fr dilation, GBx wnl w/o Hp, colonoscopy to distal TC, CBx chronic colitis (non-sp) w/one crypt abscess.  2004 [facility] liver Bx: fatty liver, no fibrosis.  2017 [facility] E/C gitis, 2 polyps.  6/2021 [facility] EGD eitis, HH, gitis, colon wnl.    Notify patient that I reviewed her records. Pull path from [facility] E/C procedures 2017 and 2021.  I did not see any comment regarding an ERCP.  NBP

## 2022-11-11 NOTE — TELEPHONE ENCOUNTER
Pt informed.   Pt wanted to report that the Trulance samples she tried did not help with her constipation. She states she had to take Miralax, but is still finding that she is not emptying herself completely when using the bathroom.

## 2022-11-11 NOTE — TELEPHONE ENCOUNTER
She may try Linzess 290. Okay to give 2 boxes of samples if we have them. If not we can send RX to her pharmacy. I would also recommend using Andreina Pinon with each restroom visit. She can order this online.   MLC

## 2022-11-11 NOTE — TELEPHONE ENCOUNTER
2017 Cardia Bx: nl, cecal polyp: benign with eosinophils, 1 AC TA.   2021 ABx mild chr gitis, distEBx reflux.    No comments in her records regarding an ERCP evaluation.  NBP

## 2022-11-12 ENCOUNTER — PATIENT MESSAGE (OUTPATIENT)
Dept: GASTROENTEROLOGY | Facility: CLINIC | Age: 67
End: 2022-11-12
Payer: MEDICARE

## 2022-12-14 ENCOUNTER — OUTSIDE PLACE OF SERVICE (OUTPATIENT)
Dept: GASTROENTEROLOGY | Facility: CLINIC | Age: 67
End: 2022-12-14

## 2022-12-14 VITALS — WEIGHT: 145 LBS | BODY MASS INDEX: 25.69 KG/M2 | HEIGHT: 63 IN

## 2022-12-14 PROCEDURE — 43239 PR EGD, FLEX, W/BIOPSY, SGL/MULTI: ICD-10-PCS | Mod: ,,, | Performed by: INTERNAL MEDICINE

## 2022-12-14 PROCEDURE — 43239 EGD BIOPSY SINGLE/MULTIPLE: CPT | Mod: ,,, | Performed by: INTERNAL MEDICINE

## 2022-12-14 NOTE — TELEPHONE ENCOUNTER
"[facility]  [address] [place] [address]  Phone: [phone]  Fax: [phone]    History & Physical         Provider: Dr. Leanna Ken    Patient Name: Blanca CAGLE (age):1955  67 y.o.           Gender: female   Phone: [phone]     Referring Physician: Justin Muñoz     Vital Signs:   Height - 5' 3"  Weight - 145 lbs  BMI -  25.69    Plan: EGD    Encounter Diagnoses   Name Primary?    Gastroesophageal reflux disease, unspecified whether esophagitis present Yes    History of duodenal ulcer            History:      Past Medical History:   Diagnosis Date    Arthritis     Colon polyp     Fibromyositis     Immunodeficiency     Mental disorder     depression    Neuropathy       Past Surgical History:   Procedure Laterality Date    AUGMENTATION OF BREAST Bilateral     CATARACT EXTRACTION Bilateral     CHOLECYSTECTOMY      COLONOSCOPY      EYE SURGERY      FIRST RIB REMOVAL      heart ablation      HYSTERECTOMY      LASER PHOTOCOAGULATION OF RETINA Left     prescectoral neurectomy      tonsilectomy        Medication List with Changes/Refills   Current Medications    ACETAMINOPHEN (TYLENOL) 325 MG TABLET    Take 650 mg by mouth every 6 (six) hours as needed.    BUPROPION (WELLBUTRIN SR) 150 MG TBSR 12 HR TABLET    Take 150 mg by mouth 2 (two) times daily.    CETIRIZINE (ZYRTEC) 5 MG TABLET    Take 5 mg by mouth daily as needed.    CONJUGATED ESTROGENS (PREMARIN) VAGINAL CREAM    Place 1 g vaginally twice a week.    CYCLOBENZAPRINE (FLEXERIL) 10 MG TABLET        ESTRADIOL (ESTRACE) 0.01 % (0.1 MG/GRAM) VAGINAL CREAM    1 g.    FLUOCINOLONE ACETONIDE OIL 0.01 % DROP    APPLY 5 DROPS TO AFFECTED EAR ONCE DAILY FOR 30 DAYS    FLUTICASONE PROPIONATE (FLONASE) 50 MCG/ACTUATION NASAL SPRAY    2 sprays by Nasal route.    GABAPENTIN (NEURONTIN) 600 MG TABLET    Take 600 mg by mouth once daily.    " HYDROCODONE-ACETAMINOPHEN (NORCO) 5-325 MG PER TABLET    Take 1 tablet by mouth 3 (three) times daily.    IBUPROFEN (ADVIL,MOTRIN) 200 MG TABLET    Take 200 mg by mouth every 6 (six) hours as needed.    IMMUN GLOB G,IGG,-PRO-IGA 0-50 (HIZENTRA) 10 GRAM/50 ML (20 %) SOLN    Hizentra 10 gram/50 mL (20 %) subcutaneous solution   Infuse TEN grams SUBCUTANEOUSLY ONCE WEEKLY    IPRATROPIUM (ATROVENT) 42 MCG (0.06 %) NASAL SPRAY    USE 2 SPRAYS IN EACH NOSTRIL TWICE DAILY AS NEEDED    PANTOPRAZOLE (PROTONIX) 40 MG TABLET    Take 1 tablet (40 mg total) by mouth once daily.    PAROXETINE (PAXIL) 10 MG TABLET        PAROXETINE (PAXIL) 40 MG TABLET    paroxetine 40 mg tablet   Take 1 tablet every day by oral route.    PAROXETINE (PAXIL-CR) 25 MG 24 HR TABLET    Take 25 mg by mouth every morning.    TIZANIDINE (ZANAFLEX) 4 MG TABLET    Take 4 mg by mouth 3 (three) times daily as needed.      Review of patient's allergies indicates:  No Known Allergies   Family History   Problem Relation Age of Onset    Liver disease Mother     Stroke Brother     Cirrhosis Brother     Breast cancer Paternal Aunt     Bone cancer Paternal Aunt       Social History     Tobacco Use    Smoking status: Never    Smokeless tobacco: Never   Substance Use Topics    Alcohol use: Never    Drug use: Never        Physical Examination:     General Appearance:___________________________  HEENT: _____________________________________  Abdomen:____________________________________  Heart:________________________________________  Lungs:_______________________________________  Extremities:___________________________________  Skin:_________________________________________  Endocrine:____________________________________  Genitourinary:_________________________________  Neurological:__________________________________      Patient has been evaluated immediately prior to sedation and is medically cleared for endoscopy with IVCS as an ASA class: ______      Physician  Signature: _________________________       Date: ________  Time: ________

## 2022-12-21 ENCOUNTER — TELEPHONE (OUTPATIENT)
Dept: GASTROENTEROLOGY | Facility: CLINIC | Age: 67
End: 2022-12-21
Payer: MEDICARE

## 2022-12-22 NOTE — TELEPHONE ENCOUNTER
DBx nl, GBx react w/mild chr infl w/o Hp, EBx nl.  Notify patient. No signs of infection, precancerous cells or Celiac disease on the upper endoscopy biopsies.  She was to  samples for constipation. Did she begin them and has she had a response?  NBP

## 2022-12-23 ENCOUNTER — PATIENT MESSAGE (OUTPATIENT)
Dept: GASTROENTEROLOGY | Facility: CLINIC | Age: 67
End: 2022-12-23
Payer: MEDICARE

## 2022-12-23 ENCOUNTER — TELEPHONE (OUTPATIENT)
Dept: GASTROENTEROLOGY | Facility: CLINIC | Age: 67
End: 2022-12-23
Payer: MEDICARE

## 2022-12-23 NOTE — TELEPHONE ENCOUNTER
----- Message from Sophie Denise sent at 12/23/2022 11:07 AM CST -----  Type:  Patient Returning Call    Who Called:Blanca Ramirez  Who Left Message for Patient:Beata   Does the patient know what this is regarding?:-  Would the patient rather a call back or a response via [employer]? -  Best Call Back Number: [phone]    Additional Information: returning your call

## 2022-12-23 NOTE — TELEPHONE ENCOUNTER
Sent patient portal message to let her know Beata will be back in office on Tuesday Dec 27th and can give her results. I also asked patient if she had picked up samples for constipation and if she has had any response to them. BF

## 2022-12-27 NOTE — TELEPHONE ENCOUNTER
She should be taking Linzess 290 mcg daily samples.  I recommend she use a squatty potty with every restroom visit at home, continue the Linzess (I will send a prescription to her pharmacy), and if needed she may begin daily MiraLax (generic okay, may begin at half a dose a day and adjust dose every 1-2 weeks until having soft daily easy to pass bowel movements).  KIRILL

## 2022-12-28 ENCOUNTER — PATIENT MESSAGE (OUTPATIENT)
Dept: GASTROENTEROLOGY | Facility: CLINIC | Age: 67
End: 2022-12-28
Payer: MEDICARE

## 2023-01-05 RX ORDER — ONDANSETRON 4 MG/1
4 TABLET, ORALLY DISINTEGRATING ORAL EVERY 12 HOURS PRN
Qty: 30 TABLET | Refills: 2 | Status: SHIPPED | OUTPATIENT
Start: 2023-01-05 | End: 2023-04-04

## 2023-01-05 NOTE — TELEPHONE ENCOUNTER
I sent Zofran to her pharmacy. Linzess, Trulance and Amitiza are the main medicines for constipation. If those are cost prohibitive, then she may need to see if her Rx plan has a list of alternative that are better covered by her plan. In the meantime, she may take the MiraLAX with the Squatty Potty and adjust the dose once weekly until having soft daily, easy to pass BMs.  NBP

## 2023-01-07 ENCOUNTER — PATIENT MESSAGE (OUTPATIENT)
Dept: GASTROENTEROLOGY | Facility: CLINIC | Age: 68
End: 2023-01-07
Payer: MEDICARE

## 2023-01-25 NOTE — TELEPHONE ENCOUNTER
Pt never picked up Linzess states that it was too expensive.   Currently taking MiraLAX and dulcolax and she is still experiencing constipation.

## 2023-01-25 NOTE — TELEPHONE ENCOUNTER
She will need to check with her prescription insurance plan to see if there is a medication well covered for constipation.  How much MiraLax is she taking?  She can increase the dose of MiraLax if needed and take twice a day if only taking once daily at this time.   MLC

## 2023-04-04 ENCOUNTER — OFFICE VISIT (OUTPATIENT)
Dept: GASTROENTEROLOGY | Facility: CLINIC | Age: 68
End: 2023-04-04
Payer: MEDICARE

## 2023-04-04 VITALS
BODY MASS INDEX: 26.43 KG/M2 | SYSTOLIC BLOOD PRESSURE: 172 MMHG | WEIGHT: 149.19 LBS | HEIGHT: 63 IN | OXYGEN SATURATION: 98 % | DIASTOLIC BLOOD PRESSURE: 71 MMHG | HEART RATE: 68 BPM

## 2023-04-04 DIAGNOSIS — Z86.010 HISTORY OF COLON POLYPS: ICD-10-CM

## 2023-04-04 DIAGNOSIS — R74.8 ELEVATED LIVER ENZYMES: ICD-10-CM

## 2023-04-04 DIAGNOSIS — K76.0 FATTY LIVER: ICD-10-CM

## 2023-04-04 DIAGNOSIS — K21.9 GASTROESOPHAGEAL REFLUX DISEASE, UNSPECIFIED WHETHER ESOPHAGITIS PRESENT: Primary | ICD-10-CM

## 2023-04-04 DIAGNOSIS — K59.04 CHRONIC IDIOPATHIC CONSTIPATION: ICD-10-CM

## 2023-04-04 PROCEDURE — 99215 PR OFFICE/OUTPT VISIT, EST, LEVL V, 40-54 MIN: ICD-10-PCS | Mod: S$GLB,,, | Performed by: INTERNAL MEDICINE

## 2023-04-04 PROCEDURE — 99215 OFFICE O/P EST HI 40 MIN: CPT | Mod: S$GLB,,, | Performed by: INTERNAL MEDICINE

## 2023-04-04 RX ORDER — OXCARBAZEPINE 300 MG/1
300 TABLET, FILM COATED ORAL NIGHTLY
COMMUNITY
Start: 2023-03-02

## 2023-04-04 RX ORDER — IPRATROPIUM BROMIDE 42 UG/1
SPRAY, METERED NASAL
COMMUNITY

## 2023-04-04 RX ORDER — BUPROPION HYDROCHLORIDE 150 MG/1
1 TABLET, EXTENDED RELEASE ORAL EVERY MORNING
COMMUNITY

## 2023-04-04 RX ORDER — PANTOPRAZOLE SODIUM 20 MG/1
20 TABLET, DELAYED RELEASE ORAL DAILY
Qty: 90 TABLET | Refills: 4 | Status: SHIPPED | OUTPATIENT
Start: 2023-04-04 | End: 2023-05-11 | Stop reason: SDUPTHER

## 2023-04-04 RX ORDER — PANTOPRAZOLE SODIUM 40 MG/1
1 TABLET, DELAYED RELEASE ORAL
COMMUNITY
End: 2023-04-04 | Stop reason: SDUPTHER

## 2023-04-04 RX ORDER — HYDROCODONE BITARTRATE AND ACETAMINOPHEN 5; 325 MG/1; MG/1
1 TABLET ORAL
COMMUNITY

## 2023-04-04 RX ORDER — CELECOXIB 200 MG/1
200 CAPSULE ORAL
COMMUNITY
Start: 2023-03-23 | End: 2024-03-28

## 2023-04-04 NOTE — LETTER
April 4, 2023        Justin Muñoz MD  [address]  [address]  [facility] [place] [address]             [facility]  [address] [address]  Phone: [phone]  Fax: [phone]   Patient: Blanca Ramirez   MR Number: [id number]   YOB: 1955   Date of Visit: 4/4/2023       Dear Dr. Muñoz:    Thank you for referring Blanca Ramirez to me for evaluation. Attached you will find relevant portions of my assessment and plan of care.    If you have questions, please do not hesitate to call me. I look forward to following Blanca Ramirez along with you.    Sincerely,      Leanna Ken MD            CC  No Recipients    Enclosure

## 2023-04-04 NOTE — PATIENT INSTRUCTIONS
Send my office a copy of your blood work results.   Continue pantoprazole daily.  I have put in a prescription for a lower dose this does not work well for you let me know so we can change the prescription back to 40 mg tablets.  Begin taking fiber supplement.   Reduce saturated fats and carbohydrates.  Increase physical activity particularly with weight-bearing exercises.    Please notify my office if you have not been contacted within two weeks after any procedures, submitting any samples (biopsies, blood, stool, urine, etc.) or after any imaging (X-ray, CT, MRI, etc.).    Total Square Area In Cm2 (Whole Numbers Only Please): 264

## 2023-04-04 NOTE — PROGRESS NOTES
Clinic Note    Reason for visit:  The primary encounter diagnosis was Gastroesophageal reflux disease, unspecified whether esophagitis present. Diagnoses of Chronic idiopathic constipation, Elevated liver enzymes, History of colon polyps, and Fatty liver were also pertinent to this visit.    PCP: Justin Muñoz       HPI:  This is a 67 y.o. female who is here for a follow up. Patient with chronic constipation and GERD. She is taking pantoprazole 40 mg daily and this works well for reflux. Denies dysphagia. She states constipation comes and goes. She may take 1/2 capful of Miralax morning and evening and then after about 3 weeks of that dosing she will have diarrhea for a few days. She started Trileptal recently and believes it is causing loose stool so she has had 2-3 soft stools daily since starting that but does not feel complete. She took Linzess 290 mcg daily but did not work well for her. Does not believe she has tried Trulance.     She states in 2011 she was told she had cytomegalovirus and [place] virus.     EGD 12/14/2022: DBx nl, GBx react w/mild chr infl w/o Hp, EBx nl.    ABD US 4/2022: CBD 13mm, no GB.     4/2022: GRAVES FibroSure F0 S3 N2, 22/19, GGT 47, chol 271, .  AFP/ferr/Fe/T/TSH/CBC/LKM/APiso/A1AT/ceru nl. GGT 51H. Cr 0.95H, 17/17/155H/0.23, CMP onl, INR 0.85. sAg/eAg/HCV/HIV neg. sAb/cAb pos. HBV VL ND. HAV Ab pos, TAVO/ASMA/ANCA/AMA neg.    Review of Systems   Constitutional:  Positive for chills, diaphoresis and fatigue. Negative for fever and unexpected weight change.   HENT:  Positive for mouth sores, postnasal drip and sore throat. Negative for nosebleeds, trouble swallowing and voice change.    Eyes:  Positive for pain and eye dryness. Negative for discharge.   Respiratory:  Positive for cough, shortness of breath and wheezing. Negative for apnea, choking and chest tightness.    Cardiovascular:  Negative for chest pain, palpitations, leg swelling and claudication.   Gastrointestinal:   Positive for abdominal distention, constipation, rectal pain and reflux. Negative for abdominal pain, anal bleeding, blood in stool, change in bowel habit, diarrhea, nausea, vomiting, fecal incontinence and change in bowel habit.   Genitourinary:  Positive for bladder incontinence and flank pain. Negative for difficulty urinating, dysuria, frequency and hematuria.   Musculoskeletal:  Positive for back pain, joint swelling and joint deformity. Negative for arthralgias.   Integumentary:  Negative for color change, rash and wound.   Allergic/Immunologic: Positive for environmental allergies. Negative for food allergies.   Neurological:  Positive for headaches. Negative for seizures, facial asymmetry, speech difficulty, weakness and memory loss.   Hematological:  Negative for adenopathy. Bruises/bleeds easily.   Psychiatric/Behavioral:  Positive for sleep disturbance. Negative for agitation, behavioral problems, confusion and hallucinations.       Past Medical History:   Diagnosis Date    Arthritis     Colon polyp     Fibromyalgia     Fibromyositis     GERD (gastroesophageal reflux disease)     Immunodeficiency     Mental disorder     depression    Neuropathy      Past Surgical History:   Procedure Laterality Date    AUGMENTATION OF BREAST Bilateral     CATARACT EXTRACTION Bilateral     CHOLECYSTECTOMY      COLONOSCOPY      EYE SURGERY      FIRST RIB REMOVAL      heart ablation      HYSTERECTOMY      LASER PHOTOCOAGULATION OF RETINA Left     prescectoral neurectomy      tonsilectomy       Family History   Problem Relation Age of Onset    Liver disease Mother     Stroke Brother     Cirrhosis Brother     Breast cancer Paternal Aunt     Bone cancer Paternal Aunt      Social History     Tobacco Use    Smoking status: Never    Smokeless tobacco: Never   Substance Use Topics    Alcohol use: Never    Drug use: Never     Review of patient's allergies indicates:  No Known Allergies     Medication List with Changes/Refills    Current Medications    ACETAMINOPHEN (TYLENOL) 325 MG TABLET    Take 650 mg by mouth every 6 (six) hours as needed.    BUPROPION (WELLBUTRIN SR) 150 MG TBSR 12 HR TABLET    1 tablet once daily.    CELECOXIB (CELEBREX) 200 MG CAPSULE    Take 200 mg by mouth.    CETIRIZINE (ZYRTEC) 5 MG TABLET    Take 5 mg by mouth daily as needed.    CYCLOBENZAPRINE (FLEXERIL) 10 MG TABLET        HYDROCODONE-ACETAMINOPHEN (NORCO) 5-325 MG PER TABLET    1 tablet as needed.    IBUPROFEN (ADVIL,MOTRIN) 200 MG TABLET    Take 200 mg by mouth every 6 (six) hours as needed.    IMMUN GLOB G,IGG,-PRO-IGA 0-50 (HIZENTRA) 10 GRAM/50 ML (20 %) SOLN    Hizentra 10 gram/50 mL (20 %) subcutaneous solution   Infuse TEN grams SUBCUTANEOUSLY ONCE WEEKLY    IPRATROPIUM (ATROVENT) 42 MCG (0.06 %) NASAL SPRAY    ipratropium bromide 42 mcg (0.06 %) nasal spray   Spray 2 sprays 3 times a day by intranasal route.    OXCARBAZEPINE (TRILEPTAL) 300 MG TAB    Take 300 mg by mouth every evening.    PAROXETINE (PAXIL) 10 MG TABLET        PAROXETINE (PAXIL) 40 MG TABLET    paroxetine 40 mg tablet   Take 1 tablet every day by oral route.   Changed and/or Refilled Medications    Modified Medication Previous Medication    PANTOPRAZOLE (PROTONIX) 20 MG TABLET pantoprazole (PROTONIX) 40 MG tablet       Take 1 tablet (20 mg total) by mouth once daily.    1 tablet.   Discontinued Medications    BUPROPION (WELLBUTRIN SR) 150 MG TBSR 12 HR TABLET    Take 150 mg by mouth 2 (two) times daily.    CONJUGATED ESTROGENS (PREMARIN) VAGINAL CREAM    Place 1 g vaginally twice a week.    ESTRADIOL (ESTRACE) 0.01 % (0.1 MG/GRAM) VAGINAL CREAM    1 g.    FLUOCINOLONE ACETONIDE OIL 0.01 % DROP    APPLY 5 DROPS TO AFFECTED EAR ONCE DAILY FOR 30 DAYS    FLUTICASONE PROPIONATE (FLONASE) 50 MCG/ACTUATION NASAL SPRAY    2 sprays by Nasal route.    GABAPENTIN (NEURONTIN) 600 MG TABLET    Take 600 mg by mouth once daily.    HYDROCODONE-ACETAMINOPHEN (NORCO) 5-325 MG PER TABLET    Take 1  "tablet by mouth 3 (three) times daily.    IPRATROPIUM (ATROVENT) 42 MCG (0.06 %) NASAL SPRAY    USE 2 SPRAYS IN EACH NOSTRIL TWICE DAILY AS NEEDED    LINACLOTIDE (LINZESS) 290 MCG CAP CAPSULE    Take 1 capsule (290 mcg total) by mouth before breakfast.    ONDANSETRON (ZOFRAN-ODT) 4 MG TBDL    Take 1 tablet (4 mg total) by mouth every 12 (twelve) hours as needed (nausea/vomiting).    PANTOPRAZOLE (PROTONIX) 40 MG TABLET    Take 1 tablet (40 mg total) by mouth once daily.    PAROXETINE (PAXIL-CR) 25 MG 24 HR TABLET    Take 25 mg by mouth every morning.    TIZANIDINE (ZANAFLEX) 4 MG TABLET    Take 4 mg by mouth 3 (three) times daily as needed.         Vital Signs:  BP (!) 172/71   Pulse 68   Ht 5' 3" (1.6 m)   Wt 67.7 kg (149 lb 3.2 oz)   SpO2 98%   BMI 26.43 kg/m²         Physical Exam  Vitals reviewed.   Constitutional:       General: She is awake. She is not in acute distress.     Appearance: Normal appearance. She is well-developed. She is not ill-appearing, toxic-appearing or diaphoretic.   HENT:      Head: Normocephalic and atraumatic.      Nose: Nose normal.      Mouth/Throat:      Mouth: Mucous membranes are moist.      Pharynx: Oropharynx is clear. No oropharyngeal exudate or posterior oropharyngeal erythema.   Eyes:      General: Lids are normal. Gaze aligned appropriately. No scleral icterus.        Right eye: No discharge.         Left eye: No discharge.      Extraocular Movements: Extraocular movements intact.      Conjunctiva/sclera: Conjunctivae normal.   Neck:      Trachea: Trachea normal.   Cardiovascular:      Rate and Rhythm: Normal rate and regular rhythm.      Pulses:           Radial pulses are 2+ on the right side and 2+ on the left side.   Pulmonary:      Effort: Pulmonary effort is normal. No respiratory distress.      Breath sounds: Normal breath sounds. No stridor. No wheezing or rhonchi.   Chest:      Chest wall: No tenderness.   Abdominal:      General: Bowel sounds are normal. There " is no distension.      Palpations: Abdomen is soft. There is no fluid wave, hepatomegaly or mass.      Tenderness: There is no abdominal tenderness. There is no guarding or rebound.   Musculoskeletal:         General: No tenderness or deformity.      Cervical back: Full passive range of motion without pain and neck supple. No tenderness.      Right lower leg: No edema.      Left lower leg: No edema.   Lymphadenopathy:      Cervical: No cervical adenopathy.   Skin:     General: Skin is warm and dry.      Capillary Refill: Capillary refill takes less than 2 seconds.      Coloration: Skin is not cyanotic, jaundiced or pale.      Findings: No rash.   Neurological:      General: No focal deficit present.      Mental Status: She is alert and oriented to person, place, and time.      Cranial Nerves: No facial asymmetry.      Motor: No tremor.   Psychiatric:         Attention and Perception: Attention normal.         Mood and Affect: Mood and affect normal.         Speech: Speech normal.         Behavior: Behavior normal. Behavior is cooperative.          All of the data above and below has been reviewed by myself and any further interpretations will be reflected in the assessment and plan.   The data includes review of external notes, and independent interpretation of lab results, procedures, x-rays, and imaging reports.      Assessment:  Gastroesophageal reflux disease, unspecified whether esophagitis present  -     pantoprazole (PROTONIX) 20 MG tablet; Take 1 tablet (20 mg total) by mouth once daily.  Dispense: 90 tablet; Refill: 4    Chronic idiopathic constipation    Elevated liver enzymes    History of colon polyps    Fatty liver      Having loose stools. She just bought Metamucil. Trial of fiber. Using squatty potty.   Will get copy of lab results in 2 weeks with Dr. Muñoz to check LFTs.   GERD controlled on panto 40 daily. In an attempt to minimize her acid suppression therapy we will place a prescription for  pantoprazole 20 mg tablets that she can take once a day.  If this does not work for oral for her she will need to let us know so we can change the prescription back to 40 mg.     Recommendations:  Send my office a copy of your blood work results.   Continue pantoprazole daily.  I have put in a prescription for a lower dose this does not work well for you let me know so we can change the prescription back to 40 mg tablets.  Begin taking fiber supplement.   Reduce saturated fats and carbohydrates.  Increase physical activity particularly with weight-bearing exercises.    Please notify my office if you have not been contacted within two weeks after any procedures, submitting any samples (biopsies, blood, stool, urine, etc.) or after any imaging (X-ray, CT, MRI, etc.).     Risks, benefits, and alternatives of medical management, any associated procedures, and/or treatment discussed with the patient. Patient given opportunity to ask questions and voices understanding. Patient has elected to proceed with the recommended care modalities as discussed.    Follow up in about 1 year (around 4/4/2024).    Order summary:  No orders of the defined types were placed in this encounter.       Instructed patient to notify my office if they have not been contacted within two weeks after any procedures, submitting any samples (biopsies, blood, stool, urine, etc.) or after any imaging (X-ray, CT, MRI, etc.).     Leanna Ken MD    This document may have been created using a voice recognition transcribing system. Incorrect words or phrases may have been missed during proofreading. Please interpret accordingly or contact me for clarification.

## 2023-05-09 ENCOUNTER — PATIENT MESSAGE (OUTPATIENT)
Dept: RESEARCH | Facility: HOSPITAL | Age: 68
End: 2023-05-09
Payer: MEDICARE

## 2023-05-10 ENCOUNTER — PATIENT MESSAGE (OUTPATIENT)
Dept: GASTROENTEROLOGY | Facility: CLINIC | Age: 68
End: 2023-05-10
Payer: MEDICARE

## 2023-05-10 DIAGNOSIS — K21.9 GASTROESOPHAGEAL REFLUX DISEASE, UNSPECIFIED WHETHER ESOPHAGITIS PRESENT: ICD-10-CM

## 2023-05-11 RX ORDER — PANTOPRAZOLE SODIUM 40 MG/1
40 TABLET, DELAYED RELEASE ORAL DAILY
Qty: 90 TABLET | Refills: 4 | Status: SHIPPED | OUTPATIENT
Start: 2023-05-11

## 2023-05-11 NOTE — TELEPHONE ENCOUNTER
Notify patient that I sent a new eRx for panto 40 daily. Okay to take famotidine with panto but I would hope she would not need the famotidine in addition to the panto. She may notify us if any issues.  KIRILL

## 2023-05-16 ENCOUNTER — PATIENT MESSAGE (OUTPATIENT)
Dept: RESEARCH | Facility: HOSPITAL | Age: 68
End: 2023-05-16
Payer: MEDICARE

## 2023-05-17 NOTE — TELEPHONE ENCOUNTER
Will need to based that on her wheezing cough since that is the reason Immunology changed her acid suppression therapy.  KIRILL

## 2023-08-10 ENCOUNTER — TELEPHONE (OUTPATIENT)
Dept: GASTROENTEROLOGY | Facility: CLINIC | Age: 68
End: 2023-08-10
Payer: MEDICARE

## 2023-08-10 NOTE — TELEPHONE ENCOUNTER
----- Message from Sophie Denise sent at 8/10/2023  2:39 PM CDT -----  Type:  Needs Medical Advice    Who Called: Blanca Ramirez    Symptoms (please be specific): -   How long has patient had these symptoms:  -  Pharmacy name and phone #:  -  Would the patient rather a call back or a response via [facility]?    Best Call Back Number: [phone]    Additional Information: pt needs to speak w/ nurse please call

## 2024-10-04 ENCOUNTER — TELEPHONE (OUTPATIENT)
Dept: GASTROENTEROLOGY | Facility: CLINIC | Age: 69
End: 2024-10-04
Payer: MEDICARE

## 2024-10-04 NOTE — TELEPHONE ENCOUNTER
Returned pt call. Pt is wanting to schedule an OV with [facility].  I told he that [facility] is scheduled out and offered NP's. Pt was find with that. 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  Apparently, she saw an immunologist regarding her recurring cough she has and had a breath test down, which she failed. They told her it had to due with acid reflux. Pt stated she was booked out so she went to [place] and now what's to come back. I told her that I would need to send a message to [facility] and contact her back. Pt acknowledge she understood. sea

## 2024-10-04 NOTE — TELEPHONE ENCOUNTER
----- Message from Shonna sent at 10/2/2024 10:35 AM CDT -----  States she would like to see Dr Ken for acid reflux esophagitis. Nothing coming up on the schedule. Please call pt [phone]. Thank you

## 2024-10-06 NOTE — TELEPHONE ENCOUNTER
The patient has transitioned her GI care to Dr. Jules (no show to one visit and cancelled another visit with me).  She needs to remain established with her current GI provider. She also previously saw Dr. Moreno.  KIRILL